# Patient Record
Sex: MALE | Race: WHITE | Employment: UNEMPLOYED | URBAN - METROPOLITAN AREA
[De-identification: names, ages, dates, MRNs, and addresses within clinical notes are randomized per-mention and may not be internally consistent; named-entity substitution may affect disease eponyms.]

---

## 2018-10-02 ENCOUNTER — HOSPITAL ENCOUNTER (OUTPATIENT)
Dept: NON INVASIVE DIAGNOSTICS | Age: 33
Discharge: HOME | End: 2018-10-02
Payer: MEDICAID

## 2018-10-02 LAB
LV EF: 55 %
LVEF MODALITY: NORMAL

## 2018-10-02 PROCEDURE — 93226 XTRNL ECG REC<48 HR SCAN A/R: CPT

## 2018-10-02 PROCEDURE — 93225 XTRNL ECG REC<48 HRS REC: CPT

## 2018-10-02 PROCEDURE — 93306 TTE W/DOPPLER COMPLETE: CPT

## 2019-11-22 ENCOUNTER — HOSPITAL ENCOUNTER (OUTPATIENT)
Dept: NON INVASIVE DIAGNOSTICS | Age: 34
Discharge: HOME | End: 2019-11-22
Payer: MEDICAID

## 2019-11-22 LAB
LV EF: 69 %
LVEF MODALITY: NORMAL

## 2019-11-22 PROCEDURE — 93306 TTE W/DOPPLER COMPLETE: CPT

## 2023-10-05 ENCOUNTER — HOSPITAL ENCOUNTER (OUTPATIENT)
Dept: RADIOLOGY | Facility: HOSPITAL | Age: 38
Discharge: HOME | End: 2023-10-05
Payer: COMMERCIAL

## 2023-10-05 DIAGNOSIS — R20.2 PARESTHESIA OF SKIN: ICD-10-CM

## 2023-10-05 PROCEDURE — 70450 CT HEAD/BRAIN W/O DYE: CPT

## 2023-10-05 PROCEDURE — 70450 CT HEAD/BRAIN W/O DYE: CPT | Performed by: RADIOLOGY

## 2025-04-02 ENCOUNTER — OFFICE VISIT (OUTPATIENT)
Dept: CARDIOLOGY | Facility: HOSPITAL | Age: 40
End: 2025-04-02
Payer: COMMERCIAL

## 2025-04-02 VITALS
WEIGHT: 275.8 LBS | BODY MASS INDEX: 43.2 KG/M2 | SYSTOLIC BLOOD PRESSURE: 111 MMHG | HEART RATE: 72 BPM | DIASTOLIC BLOOD PRESSURE: 79 MMHG | OXYGEN SATURATION: 96 %

## 2025-04-02 DIAGNOSIS — I25.10 CORONARY ARTERY DISEASE INVOLVING NATIVE CORONARY ARTERY OF NATIVE HEART WITHOUT ANGINA PECTORIS: Primary | ICD-10-CM

## 2025-04-02 DIAGNOSIS — I10 HYPERTENSION, UNSPECIFIED TYPE: ICD-10-CM

## 2025-04-02 DIAGNOSIS — E78.5 HYPERLIPIDEMIA, UNSPECIFIED HYPERLIPIDEMIA TYPE: ICD-10-CM

## 2025-04-02 LAB
ATRIAL RATE: 81 BPM
P AXIS: 18 DEGREES
P OFFSET: 193 MS
P ONSET: 155 MS
PR INTERVAL: 128 MS
Q ONSET: 219 MS
QRS COUNT: 13 BEATS
QRS DURATION: 80 MS
QT INTERVAL: 364 MS
QTC CALCULATION(BAZETT): 422 MS
QTC FREDERICIA: 402 MS
R AXIS: 39 DEGREES
T AXIS: -1 DEGREES
T OFFSET: 401 MS
VENTRICULAR RATE: 81 BPM

## 2025-04-02 PROCEDURE — 1036F TOBACCO NON-USER: CPT | Performed by: NURSE PRACTITIONER

## 2025-04-02 PROCEDURE — 93005 ELECTROCARDIOGRAM TRACING: CPT | Performed by: NURSE PRACTITIONER

## 2025-04-02 PROCEDURE — 3078F DIAST BP <80 MM HG: CPT | Performed by: NURSE PRACTITIONER

## 2025-04-02 PROCEDURE — 3074F SYST BP LT 130 MM HG: CPT | Performed by: NURSE PRACTITIONER

## 2025-04-02 PROCEDURE — 99214 OFFICE O/P EST MOD 30 MIN: CPT | Performed by: NURSE PRACTITIONER

## 2025-04-02 RX ORDER — LISINOPRIL 20 MG/1
20 TABLET ORAL DAILY
COMMUNITY

## 2025-04-02 RX ORDER — ATORVASTATIN CALCIUM 80 MG/1
1 TABLET, FILM COATED ORAL NIGHTLY
COMMUNITY
Start: 2022-07-18

## 2025-04-02 RX ORDER — NAPROXEN SODIUM 220 MG/1
81 TABLET, FILM COATED ORAL DAILY
Start: 2025-04-02 | End: 2026-04-02

## 2025-04-02 ASSESSMENT — ENCOUNTER SYMPTOMS
CONSTITUTIONAL NEGATIVE: 1
RESPIRATORY NEGATIVE: 1
MYALGIAS: 1
NEUROLOGICAL NEGATIVE: 1
GASTROINTESTINAL NEGATIVE: 1
ENDOCRINE NEGATIVE: 1
HEMATOLOGIC/LYMPHATIC NEGATIVE: 1
PSYCHIATRIC NEGATIVE: 1
EYES NEGATIVE: 1

## 2025-04-02 NOTE — PROGRESS NOTES
Referred by Dr. Davies ref. provider found for Follow-up     History Of Present Illness:    Timothy Stewart is a very pleasant 39 year old gentleman with a history of HTN, HLD and CAD s/p PCI to LAD, he is here for a follow up visit. The patient is seen in collaboration with Dr. Dennis. Mr. Stewart has had intermittent chest pain. Occasional shortness of breath. Has not been active.     Review of Systems   Constitutional: Negative.   HENT: Negative.     Eyes: Negative.    Cardiovascular:  Positive for chest pain.   Respiratory: Negative.     Endocrine: Negative.    Hematologic/Lymphatic: Negative.    Skin: Negative.    Musculoskeletal:  Positive for myalgias.   Gastrointestinal: Negative.    Neurological: Negative.    Psychiatric/Behavioral: Negative.        Past Medical History:  He has no past medical history on file.    Past Surgical History:  He has no past surgical history on file.      Social History:  He reports that he has never smoked. He has never used smokeless tobacco. He reports that he does not drink alcohol and does not use drugs.    Family History:  No family history on file.     Allergies:  Patient has no known allergies.    Outpatient Medications:  Current Outpatient Medications   Medication Instructions    aspirin 81 mg, oral, Daily    atorvastatin (Lipitor) 80 mg tablet 1 tablet, Nightly    lisinopril 20 mg, Daily        Last Recorded Vitals:  Vitals:    04/02/25 0954   BP: 111/79   Pulse: 72   SpO2: 96%   Weight: 125 kg (275 lb 12.7 oz)       Physical Exam:  Physical Exam  Vitals reviewed.   HENT:      Head: Normocephalic.      Nose: Nose normal.   Eyes:      Pupils: Pupils are equal, round, and reactive to light.   Cardiovascular:      Rate and Rhythm: Normal rate and regular rhythm.   Pulmonary:      Effort: Pulmonary effort is normal.      Breath sounds: Normal breath sounds.   Abdominal:      General: Abdomen is flat.      Palpations: Abdomen is soft.   Musculoskeletal:         General: Normal range  of motion.      Cervical back: Normal range of motion.   Skin:     General: Skin is warm and dry.   Neurological:      General: No focal deficit present.      Mental Status: He is alert and oriented to person, place, and time.   Psychiatric:         Mood and Affect: Mood normal.            Last Labs:  CBC -  Lab Results   Component Value Date    WBC 6.5 06/28/2022    HGB 14.4 06/28/2022    HCT 43.0 06/28/2022    MCV 92 06/28/2022     06/28/2022       CMP -  Lab Results   Component Value Date    CALCIUM 8.3 (L) 06/29/2022    PROT 7.5 06/27/2022    ALBUMIN 4.2 06/27/2022    AST 37 06/27/2022    ALT 39 06/27/2022    ALKPHOS 100 06/27/2022    BILITOT 0.4 06/27/2022       LIPID PANEL -   Lab Results   Component Value Date    CHOL 139 06/28/2022    TRIG 77 06/28/2022    HDL 25.1 (A) 06/28/2022    CHHDL 5.5 (A) 06/28/2022    LDLF 99 06/28/2022    VLDL 15 06/28/2022       RENAL FUNCTION PANEL -   Lab Results   Component Value Date    GLUCOSE 89 06/29/2022     06/29/2022    K 3.6 06/29/2022     06/29/2022    CO2 25 06/29/2022    ANIONGAP 13 06/29/2022    BUN 12 06/29/2022    CREATININE 0.77 06/29/2022    GFRMALE >90 06/29/2022    CALCIUM 8.3 (L) 06/29/2022    ALBUMIN 4.2 06/27/2022        Lab Results   Component Value Date     (H) 06/28/2022    HGBA1C 5.3 06/29/2022       Last Cardiology Tests:  ECG:  EKG independently reviewed from today showed sinus rhythm heart rate 81 bpm   Echo:  ECHO: 6/29/2022  1. The left ventricular systolic function is normal with a 55-60% estimated ejection fraction.  2. Spectral Doppler shows an impaired relaxation pattern of left ventricular diastolic filling.  3. There is mild aortic valve regurgitation.  4. There is mild mitral, tricuspid and pulmonic regurgitation.  5. There is a small atrial septal defect.  Ejection Fractions:  LVEF 55-60%  Cath:  HC/PCI: 6/28/2022  1. Left main: separate LAD/LCx ostia.  2. LAD: 95% proximal LAD NSTEMI culprit lesion.  3. LCx: no  significant angiographic disease.  4. RCA: no significant angiographic disease.  5. LVEDP 25mmHg.  6. Successful PCI to proximal LAD NSTEMI culprit lesion with one Resolute AMBERLY.     PCI to proximal LAD  Stress Test:  Nuclear Stress Test 01/26/2023  1. Normal study with no scintigraphic evidence for stress induced  ischemia or prior myocardial infarction.  2. The left ventricle is of normal size and demonstrates normal wall  motion with an estimated ejection fraction of  > 65%.    Cardiac Imaging:      Assessment/Plan   Very pleasant 39 year old male with a history of HTN, HLD and CAD s/p PCI to LAD ( 6/28/2022), he continues to do well from a cardiac standpoint. Denies chest pain or shortness of breath. He was encouraged to increase his activity. Heart rate and blood pressure is well controlled today.     Plan:  Continue Lisinopril  20 mg daily   Continue Aspirin 81 mg daily indefinitely  Continue atorvastatin 80 mg daily       Ellen Diamond, APRN-CNP
